# Patient Record
Sex: FEMALE | Race: OTHER | NOT HISPANIC OR LATINO | URBAN - METROPOLITAN AREA
[De-identification: names, ages, dates, MRNs, and addresses within clinical notes are randomized per-mention and may not be internally consistent; named-entity substitution may affect disease eponyms.]

---

## 2017-04-23 ENCOUNTER — EMERGENCY (EMERGENCY)
Facility: HOSPITAL | Age: 22
LOS: 1 days | Discharge: HOME | End: 2017-04-23
Attending: EMERGENCY MEDICINE
Payer: COMMERCIAL

## 2017-04-23 VITALS
TEMPERATURE: 99 F | OXYGEN SATURATION: 100 % | RESPIRATION RATE: 20 BRPM | SYSTOLIC BLOOD PRESSURE: 113 MMHG | DIASTOLIC BLOOD PRESSURE: 71 MMHG | HEIGHT: 64 IN | HEART RATE: 80 BPM | WEIGHT: 125 LBS

## 2017-04-23 DIAGNOSIS — H00.034 ABSCESS OF LEFT UPPER EYELID: ICD-10-CM

## 2017-04-23 PROCEDURE — 11042 DBRDMT SUBQ TIS 1ST 20SQCM/<: CPT

## 2017-04-23 PROCEDURE — 99284 EMERGENCY DEPT VISIT MOD MDM: CPT | Mod: 25

## 2017-04-23 PROCEDURE — 99284 EMERGENCY DEPT VISIT MOD MDM: CPT

## 2017-04-23 PROCEDURE — 87070 CULTURE OTHR SPECIMN AEROBIC: CPT

## 2017-04-23 RX ADMIN — Medication 1 TABLET(S): at 14:00

## 2017-04-23 NOTE — ED ADULT TRIAGE NOTE - CHIEF COMPLAINT QUOTE
Sty to left eye 2 weeks ago, went to MD office on Wednesday prescribed neomycin to return for drainage a week later but sty is getting worse

## 2017-04-23 NOTE — ED PROVIDER NOTE - CHPI ED SYMPTOMS NEG
no fever, no chills, no HA, no nausea, no vomiting, no diarrhea, no HA, no visual changes/no photophobia

## 2017-04-23 NOTE — ED PROVIDER NOTE - OBJECTIVE STATEMENT
22 y/o F pt with no significant PMHx presents to ED c/o L eyelid sty x 2 weeks. Pt reports waking up with pain and difficulty to open eye this morning. Pt states visiting PMD for sx and was prescribed neomycin and instructed to applying a warm compress to sty. Pt visits today for worsening sx today. Pt denies fever, chill, nausea, vomiting, diarrhea, HA, photophobia, vision changes, or any other complaints. NKDA.

## 2017-04-23 NOTE — ED PROVIDER NOTE - ENMT, MLM
Airway patent, Nasal mucosa clear. Mouth with normal mucosa. Throat has no vesicles, no oropharyngeal exudates and uvula is midline. Swelling over L upper eye lid, protruding 1 cm with an area of fluctuance in the middle. No pain with EMO.

## 2017-04-23 NOTE — ED PROVIDER NOTE - NS ED MD SCRIBE ATTENDING SCRIBE SECTIONS
DISPOSITION/PROGRESS NOTE/PHYSICAL EXAM/HIV/PAST MEDICAL/SURGICAL/SOCIAL HISTORY/REVIEW OF SYSTEMS/HISTORY OF PRESENT ILLNESS/VITAL SIGNS( Pullset)

## 2017-04-24 ENCOUNTER — EMERGENCY (EMERGENCY)
Facility: HOSPITAL | Age: 22
LOS: 1 days | Discharge: HOME | End: 2017-04-24
Attending: EMERGENCY MEDICINE
Payer: COMMERCIAL

## 2017-04-24 VITALS
RESPIRATION RATE: 16 BRPM | SYSTOLIC BLOOD PRESSURE: 104 MMHG | HEIGHT: 63 IN | WEIGHT: 125 LBS | OXYGEN SATURATION: 100 % | DIASTOLIC BLOOD PRESSURE: 78 MMHG | TEMPERATURE: 99 F | HEART RATE: 85 BPM

## 2017-04-24 PROCEDURE — G0463: CPT

## 2017-04-24 NOTE — ED PROVIDER NOTE - OBJECTIVE STATEMENT
22 y/o female with no PMHx presents to the ED for wound check today. Pt reports that she came to the ED on the 23rd for eval of her L eyelid stye. Pt was seen by plastics and I&D was done and pt was instructed to come back to the ED for wound check. Pt seen by Plastics (Dr. Garcia) in the ED at bedside to eval the wound. Pt denies fever, chills, or any other complaints. NKDA.

## 2017-04-24 NOTE — ED ADULT NURSE NOTE - OBJECTIVE STATEMENT
STATES SHE CAME IN FOR WOUND CHECK OF LEFT EYE STY.SEEN AND EXAMINED BY  PLASTIC SURGEON , WAS SEEN BY SAME YESTERDAY. LEFT UPPER EYELID STY HEALING , NO DISCHARGE NOTED.

## 2017-04-25 LAB
CULTURE RESULTS: SIGNIFICANT CHANGE UP
SPECIMEN SOURCE: SIGNIFICANT CHANGE UP

## 2017-04-28 DIAGNOSIS — Z48.01 ENCOUNTER FOR CHANGE OR REMOVAL OF SURGICAL WOUND DRESSING: ICD-10-CM

## 2023-03-31 NOTE — ED ADULT TRIAGE NOTE - NS ED NURSE DIRECT TO ROOM YN
Patient meets the requirements of refill protocol. Refill processed.      LOV: 9/26/22  Recommended follow up time:  in 6 month(s)  Next appt:Appointment scheduled for 4/20/23     Yes

## 2023-06-19 NOTE — ED PROVIDER NOTE - CROS ED CONS ALL NEG
-- DO NOT REPLY / DO NOT REPLY ALL --  -- Message is from [facility] ([facility]) --    General Patient Message: Patient mom would like to know when the genetic test for autism will be arriving, she has only gotten information and not the test itself, please reach out to assist.      Caller Information       Type Contact Phone/Fax    06/19/2023 05:17 PM CDT Phone (Incoming) KATHRYN STEVE (Mother) [phone]        Alternative phone number: no    Can a detailed message be left? Yes    Message Turnaround:     Is it Working Hours? No - After Hours/Weekend/Holiday     Did the caller agree that this message can wait until the office reopens in the morning? YES - The Message Can Wait - Send a message to the provider's clinical support pool     [place]:    Please give this turnaround time to the caller:   \"This message will be sent to [state Provider's name]. The clinical team will fulfill your request as soon as they review your message.\"                 negative...

## 2024-06-07 NOTE — ED ADULT TRIAGE NOTE - BSA (M2)
Price (Use Numbers Only, No Special Characters Or $): 500.00 Consent: Written consent obtained, risks reviewed including but not limited to pain, scarring, infection and incomplete improvement.  Patient understands the procedure is cosmetic in nature and will require out of pocket payment. Depth In Mm: 1.5 Serum (Optional): Hyaluronic Hydrating Gel Location #4: neck Location #1: forehead Detail Level: Zone Topical Anesthesia?: 20% benzocaine, 10% lidocaine, 10% tetracaine Depth In Mm: 0.5 Location #2: face Location #3: nose Depth In Mm: 1 Post-Care Instructions: After the procedure, take precautions agains sun exposure. Do not apply sunscreen for 12 hours after the procedure. Do not apply make-up for 12 hours after the procedure. Avoid alcohol based toners for 10-14 days. After 2-3 days patients can return to their regular skin regimen. 1.6